# Patient Record
Sex: FEMALE | Race: BLACK OR AFRICAN AMERICAN | NOT HISPANIC OR LATINO | Employment: OTHER | URBAN - METROPOLITAN AREA
[De-identification: names, ages, dates, MRNs, and addresses within clinical notes are randomized per-mention and may not be internally consistent; named-entity substitution may affect disease eponyms.]

---

## 2024-08-03 ENCOUNTER — HOSPITAL ENCOUNTER (EMERGENCY)
Facility: HOSPITAL | Age: 44
Discharge: HOME | End: 2024-08-03
Attending: EMERGENCY MEDICINE
Payer: COMMERCIAL

## 2024-08-03 ENCOUNTER — APPOINTMENT (OUTPATIENT)
Dept: RADIOLOGY | Facility: HOSPITAL | Age: 44
End: 2024-08-03
Payer: COMMERCIAL

## 2024-08-03 VITALS
RESPIRATION RATE: 16 BRPM | TEMPERATURE: 97.3 F | SYSTOLIC BLOOD PRESSURE: 96 MMHG | DIASTOLIC BLOOD PRESSURE: 60 MMHG | OXYGEN SATURATION: 97 % | BODY MASS INDEX: 37.69 KG/M2 | HEART RATE: 67 BPM | HEIGHT: 60 IN | WEIGHT: 192 LBS

## 2024-08-03 DIAGNOSIS — N93.8 DYSFUNCTIONAL UTERINE BLEEDING: ICD-10-CM

## 2024-08-03 DIAGNOSIS — D21.9 FIBROID: ICD-10-CM

## 2024-08-03 DIAGNOSIS — N83.201 CYST OF RIGHT OVARY: ICD-10-CM

## 2024-08-03 DIAGNOSIS — R10.30 LOWER ABDOMINAL PAIN: Primary | ICD-10-CM

## 2024-08-03 LAB
ABO GROUP (TYPE) IN BLOOD: NORMAL
ALBUMIN SERPL BCP-MCNC: 3.9 G/DL (ref 3.4–5)
ALP SERPL-CCNC: 76 U/L (ref 33–110)
ALT SERPL W P-5'-P-CCNC: 18 U/L (ref 7–45)
ANION GAP SERPL CALC-SCNC: 11 MMOL/L (ref 10–20)
ANTIBODY SCREEN: NORMAL
AST SERPL W P-5'-P-CCNC: 11 U/L (ref 9–39)
BASOPHILS # BLD AUTO: 0.08 X10*3/UL (ref 0–0.1)
BASOPHILS NFR BLD AUTO: 1.3 %
BILIRUB SERPL-MCNC: 0.6 MG/DL (ref 0–1.2)
BUN SERPL-MCNC: 9 MG/DL (ref 6–23)
CALCIUM SERPL-MCNC: 8.5 MG/DL (ref 8.6–10.3)
CHLORIDE SERPL-SCNC: 105 MMOL/L (ref 98–107)
CLUE CELLS SPEC QL WET PREP: NORMAL
CO2 SERPL-SCNC: 27 MMOL/L (ref 21–32)
CREAT SERPL-MCNC: 0.74 MG/DL (ref 0.5–1.05)
EGFRCR SERPLBLD CKD-EPI 2021: >90 ML/MIN/1.73M*2
EOSINOPHIL # BLD AUTO: 0.31 X10*3/UL (ref 0–0.7)
EOSINOPHIL NFR BLD AUTO: 5.1 %
ERYTHROCYTE [DISTWIDTH] IN BLOOD BY AUTOMATED COUNT: 12.8 % (ref 11.5–14.5)
GLUCOSE SERPL-MCNC: 73 MG/DL (ref 74–99)
HCG UR QL IA.RAPID: NEGATIVE
HCT VFR BLD AUTO: 37.5 % (ref 36–46)
HGB BLD-MCNC: 11.8 G/DL (ref 12–16)
IMM GRANULOCYTES # BLD AUTO: 0.02 X10*3/UL (ref 0–0.7)
IMM GRANULOCYTES NFR BLD AUTO: 0.3 % (ref 0–0.9)
LYMPHOCYTES # BLD AUTO: 1.63 X10*3/UL (ref 1.2–4.8)
LYMPHOCYTES NFR BLD AUTO: 26.9 %
MCH RBC QN AUTO: 28 PG (ref 26–34)
MCHC RBC AUTO-ENTMCNC: 31.5 G/DL (ref 32–36)
MCV RBC AUTO: 89 FL (ref 80–100)
MONOCYTES # BLD AUTO: 0.38 X10*3/UL (ref 0.1–1)
MONOCYTES NFR BLD AUTO: 6.3 %
NEUTROPHILS # BLD AUTO: 3.63 X10*3/UL (ref 1.2–7.7)
NEUTROPHILS NFR BLD AUTO: 60.1 %
NRBC BLD-RTO: 0 /100 WBCS (ref 0–0)
PLATELET # BLD AUTO: 231 X10*3/UL (ref 150–450)
POTASSIUM SERPL-SCNC: 3.6 MMOL/L (ref 3.5–5.3)
PROT SERPL-MCNC: 7 G/DL (ref 6.4–8.2)
RBC # BLD AUTO: 4.22 X10*6/UL (ref 4–5.2)
RH FACTOR (ANTIGEN D): NORMAL
SODIUM SERPL-SCNC: 139 MMOL/L (ref 136–145)
T VAGINALIS SPEC QL WET PREP: NORMAL
TRICHOMONAS REFLEX COMMENT: NORMAL
WBC # BLD AUTO: 6.1 X10*3/UL (ref 4.4–11.3)
WBC VAG QL WET PREP: NORMAL
YEAST VAG QL WET PREP: NORMAL

## 2024-08-03 PROCEDURE — 80053 COMPREHEN METABOLIC PANEL: CPT | Performed by: EMERGENCY MEDICINE

## 2024-08-03 PROCEDURE — 87661 TRICHOMONAS VAGINALIS AMPLIF: CPT | Mod: AHULAB | Performed by: EMERGENCY MEDICINE

## 2024-08-03 PROCEDURE — 99284 EMERGENCY DEPT VISIT MOD MDM: CPT | Mod: 25

## 2024-08-03 PROCEDURE — 76856 US EXAM PELVIC COMPLETE: CPT | Mod: FOREIGN READ | Performed by: RADIOLOGY

## 2024-08-03 PROCEDURE — 85025 COMPLETE CBC W/AUTO DIFF WBC: CPT | Performed by: EMERGENCY MEDICINE

## 2024-08-03 PROCEDURE — 87491 CHLMYD TRACH DNA AMP PROBE: CPT | Mod: AHULAB | Performed by: EMERGENCY MEDICINE

## 2024-08-03 PROCEDURE — 76830 TRANSVAGINAL US NON-OB: CPT

## 2024-08-03 PROCEDURE — 87210 SMEAR WET MOUNT SALINE/INK: CPT | Performed by: EMERGENCY MEDICINE

## 2024-08-03 PROCEDURE — 2500000001 HC RX 250 WO HCPCS SELF ADMINISTERED DRUGS (ALT 637 FOR MEDICARE OP): Performed by: EMERGENCY MEDICINE

## 2024-08-03 PROCEDURE — 81025 URINE PREGNANCY TEST: CPT | Performed by: EMERGENCY MEDICINE

## 2024-08-03 PROCEDURE — 76830 TRANSVAGINAL US NON-OB: CPT | Mod: FOREIGN READ | Performed by: RADIOLOGY

## 2024-08-03 PROCEDURE — 96374 THER/PROPH/DIAG INJ IV PUSH: CPT

## 2024-08-03 PROCEDURE — 86901 BLOOD TYPING SEROLOGIC RH(D): CPT | Performed by: EMERGENCY MEDICINE

## 2024-08-03 PROCEDURE — 2500000004 HC RX 250 GENERAL PHARMACY W/ HCPCS (ALT 636 FOR OP/ED): Performed by: EMERGENCY MEDICINE

## 2024-08-03 PROCEDURE — 96375 TX/PRO/DX INJ NEW DRUG ADDON: CPT

## 2024-08-03 PROCEDURE — 36415 COLL VENOUS BLD VENIPUNCTURE: CPT | Performed by: EMERGENCY MEDICINE

## 2024-08-03 RX ORDER — MORPHINE SULFATE 4 MG/ML
4 INJECTION, SOLUTION INTRAMUSCULAR; INTRAVENOUS ONCE
Status: COMPLETED | OUTPATIENT
Start: 2024-08-03 | End: 2024-08-03

## 2024-08-03 RX ORDER — OXYCODONE HYDROCHLORIDE 5 MG/1
5 TABLET ORAL ONCE
Status: COMPLETED | OUTPATIENT
Start: 2024-08-03 | End: 2024-08-03

## 2024-08-03 RX ORDER — ACETAMINOPHEN 325 MG/1
975 TABLET ORAL ONCE
Status: COMPLETED | OUTPATIENT
Start: 2024-08-03 | End: 2024-08-03

## 2024-08-03 RX ORDER — NAPROXEN 500 MG/1
500 TABLET ORAL
Qty: 20 TABLET | Refills: 0 | Status: SHIPPED | OUTPATIENT
Start: 2024-08-03 | End: 2024-08-18

## 2024-08-03 RX ORDER — ONDANSETRON HYDROCHLORIDE 2 MG/ML
4 INJECTION, SOLUTION INTRAVENOUS ONCE
Status: COMPLETED | OUTPATIENT
Start: 2024-08-03 | End: 2024-08-03

## 2024-08-03 RX ORDER — ACETAMINOPHEN 325 MG/1
650 TABLET ORAL EVERY 6 HOURS PRN
Qty: 30 TABLET | Refills: 0 | Status: SHIPPED | OUTPATIENT
Start: 2024-08-03

## 2024-08-03 RX ORDER — KETOROLAC TROMETHAMINE 30 MG/ML
15 INJECTION, SOLUTION INTRAMUSCULAR; INTRAVENOUS ONCE
Status: COMPLETED | OUTPATIENT
Start: 2024-08-03 | End: 2024-08-03

## 2024-08-03 RX ADMIN — KETOROLAC TROMETHAMINE 15 MG: 30 INJECTION, SOLUTION INTRAMUSCULAR at 16:16

## 2024-08-03 RX ADMIN — ONDANSETRON 4 MG: 2 INJECTION INTRAMUSCULAR; INTRAVENOUS at 14:53

## 2024-08-03 RX ADMIN — ACETAMINOPHEN 975 MG: 325 TABLET ORAL at 17:26

## 2024-08-03 RX ADMIN — OXYCODONE HYDROCHLORIDE 5 MG: 5 TABLET ORAL at 17:26

## 2024-08-03 RX ADMIN — MORPHINE SULFATE 4 MG: 4 INJECTION, SOLUTION INTRAMUSCULAR; INTRAVENOUS at 14:53

## 2024-08-03 ASSESSMENT — PAIN - FUNCTIONAL ASSESSMENT: PAIN_FUNCTIONAL_ASSESSMENT: 0-10

## 2024-08-03 ASSESSMENT — PAIN SCALES - GENERAL
PAINLEVEL_OUTOF10: 9
PAINLEVEL_OUTOF10: 6
PAINLEVEL_OUTOF10: 6
PAINLEVEL_OUTOF10: 9
PAINLEVEL_OUTOF10: 9

## 2024-08-03 ASSESSMENT — COLUMBIA-SUICIDE SEVERITY RATING SCALE - C-SSRS
2. HAVE YOU ACTUALLY HAD ANY THOUGHTS OF KILLING YOURSELF?: NO
1. IN THE PAST MONTH, HAVE YOU WISHED YOU WERE DEAD OR WISHED YOU COULD GO TO SLEEP AND NOT WAKE UP?: NO
6. HAVE YOU EVER DONE ANYTHING, STARTED TO DO ANYTHING, OR PREPARED TO DO ANYTHING TO END YOUR LIFE?: NO

## 2024-08-03 ASSESSMENT — PAIN DESCRIPTION - LOCATION: LOCATION: PELVIS

## 2024-08-03 NOTE — ED TRIAGE NOTES
Pt arrives to triage with c/o excessive vaginal bleeding during her period and significant pain in her lower abdomen and lower back. Pt states she began what she believed to be her regular menstrual cycle yesterday but has been bleeding through about 2 pads per hour and passing clots. Endorses lightheadedness and dizziness, denies CP/SOB/n/v. States she just stopped birth control last month

## 2024-08-03 NOTE — ED PROVIDER NOTES
CC: Abdominal Pain and Vaginal Bleeding     HPI: Bettye Robert is a 44-year-old female presenting to the emergency department for lower abdominal pain and heavy vaginal bleeding. States her symptoms started yesterday and bleeding has been heavier than normal menses with passage of some clots. She felt lightheaded today so she came to the ED. She denies fever, chills, shortness of breath, syncope, urinary symptoms, vaginal discharge, focal deficits. Is not on anticoagulation. LMP a few weeks ago. Notes she was previously on OCPs but these were discontinued last month.     Limitations to History: none  Additional History Obtained from: none    PMHx/PSHx:  Per HPI.   Past Medical History:   Diagnosis Date    Encounter for initial prescription of contraceptive pills 06/28/2019    Encounter for initial prescription of contraceptive pills    Encounter for pregnancy test, result positive ([facility]) 06/15/2020    Positive urine pregnancy test    Encounter for routine checking of intrauterine contraceptive device 05/30/2019    IUD check up    Irregular menstruation, unspecified 06/15/2020    Missed period    Morbid (severe) obesity due to excess calories (Multi) 05/22/2019    Obesity, Class III, BMI 40-49.9 (morbid obesity)    Other problems related to lifestyle 10/11/2018    Other problems related to lifestyle    Other specified counseling 03/08/2019    Encounter for breast feeding counseling    Other specified noninflammatory disorders of vagina 01/10/2020    Vaginal irritation    Personal history of other diseases of the female genital tract     Personal history of ovarian cyst    Personal history of other diseases of the female genital tract 11/27/2016    History of vaginal discharge    Personal history of other diseases of the respiratory system 05/16/2019    History of asthma     Past Surgical History:   Procedure Laterality Date    CHOLECYSTECTOMY  04/01/2014    Cholecystectomy    OTHER SURGICAL HISTORY  09/18/2014     Surgically Induced        Medications:   Current Outpatient Medications   Medication Instructions    acetaminophen (TYLENOL) 650 mg, oral, Every 6 hours PRN    naproxen (NAPROSYN) 500 mg, oral, 2 times daily (morning and late afternoon)        Allergies:  Allergies   Allergen Reactions    Flagyl [Metronidazole] Nausea/vomiting         ???????????????????????????????????????????????????????????????  Triage Vitals:  T 36.3 °C (97.3 °F)  HR 56  /65  RR 18  O2 100 % None (Room air)    Physical Exam   Gen: awake, well-appearing, no distress  Eyes: no conjunctival injection or scleral icterus, pupils equal, extraocular movements grossly intact  ENT: nares patent, moist mucous membranes  Neck: supple, trachea midline, no masses  Heart: regular rate and rhythm, audible heart sounds  Lungs: clear to auscultation bilaterally, no increased work of breathing  Abdomen: soft, nondistended, mild lower abdominal tenderness, no guarding or rebound  : performed with RN chaperone -- normal external genitalia, speculum exam reveals scant blood in vaginal vault, no appreciable lesions/lacerations, no cervical tenderness, +left adnexal tenderness  Extremities: well-perfused, no edema  Neuro: alert, conversant, no facial asymmetry, speech fluent, moving all extremities, steady gait     ???????????????????????????????????????????????????????????????  ED Course/Medical Decision Makin-year-old female presenting to the emergency department for lower abdominal pain and heavy vaginal bleeding. She's well-appearing, no distress. Is well-perfused. She has mild lower abdominal tenderness but is not peritonitic. Pelvic exam with left adnexal tenderness. She was given symptomatic management. She's not pregnant. Wet prep is negative. Labs are relatively unremarkable including hemoglobin. Ultrasound shows uterine fibroids and a right hemorrhagic cyst. Results were discussed. I suspect her bleeding and pain may be a  combination of ultrasound findings along with  hormonal disruption after stopping the birth control last month. Lower suspicion for acute abdominal process to warrant additional imaging at this time. She is comfortable going home. Prescriptions for analgesics sent to pharmacy. She was advised follow up with gynecologist. Given return precautions.     Diagnoses as of 08/03/24 2140   Lower abdominal pain   Cyst of right ovary   Fibroid   Dysfunctional uterine bleeding     Diagnostic imaging independently reviewed/interpreted by me, as reflected in MDM    External records reviewed: recent inpatient, clinic, and prior ED notes  Social Determinants Affecting Care: None identified  Discussion of management with other providers: gyn  Escalation of Care: outpatient management    Disposition: discharge  ED Prescriptions       Medication Sig Dispense Start Date End Date Auth. Provider    acetaminophen (TylenoL) 325 mg tablet Take 2 tablets (650 mg) by mouth every 6 hours if needed for mild pain (1 - 3) or fever (temp greater than 38.0 C). 30 tablet 8/3/2024 -- Berna Kaminski MD    naproxen (Naprosyn) 500 mg tablet Take 1 tablet (500 mg) by mouth 2 times daily (morning and late afternoon) for 15 days. 20 tablet 8/3/2024 8/18/2024 Berna Kaminski MD            Procedures ? [employer] last updated 8/3/2024 9:40 PM        Berna Kaminski MD  08/03/24 2145

## 2024-08-03 NOTE — DISCHARGE INSTRUCTIONS
You were seen in the ED for lower abdominal pain and vaginal bleeding. Your ultrasound showed uterine fibroids and a right ovarian cyst. Monitor symptoms closely. Follow up with your gynecologist. Take tylenol and naproxen regularly for the next few days. Return to the ED with worsening symptoms.

## 2024-08-04 LAB
C TRACH RRNA SPEC QL NAA+PROBE: NEGATIVE
N GONORRHOEA DNA SPEC QL PROBE+SIG AMP: NEGATIVE
T VAGINALIS RRNA SPEC QL NAA+PROBE: NEGATIVE

## 2024-12-06 ENCOUNTER — HOSPITAL ENCOUNTER (EMERGENCY)
Facility: HOSPITAL | Age: 44
Discharge: HOME | End: 2024-12-06
Payer: COMMERCIAL

## 2024-12-06 ENCOUNTER — APPOINTMENT (OUTPATIENT)
Dept: RADIOLOGY | Facility: HOSPITAL | Age: 44
End: 2024-12-06
Payer: COMMERCIAL

## 2024-12-06 VITALS
HEIGHT: 60 IN | TEMPERATURE: 97.5 F | SYSTOLIC BLOOD PRESSURE: 99 MMHG | DIASTOLIC BLOOD PRESSURE: 50 MMHG | WEIGHT: 193 LBS | HEART RATE: 73 BPM | RESPIRATION RATE: 18 BRPM | OXYGEN SATURATION: 100 % | BODY MASS INDEX: 37.89 KG/M2

## 2024-12-06 DIAGNOSIS — S93.402A SPRAIN OF LEFT ANKLE, INITIAL ENCOUNTER: Primary | ICD-10-CM

## 2024-12-06 PROCEDURE — 73630 X-RAY EXAM OF FOOT: CPT | Mod: LT

## 2024-12-06 PROCEDURE — 73630 X-RAY EXAM OF FOOT: CPT | Mod: LEFT SIDE | Performed by: RADIOLOGY

## 2024-12-06 PROCEDURE — 73610 X-RAY EXAM OF ANKLE: CPT | Mod: LT

## 2024-12-06 PROCEDURE — 2500000001 HC RX 250 WO HCPCS SELF ADMINISTERED DRUGS (ALT 637 FOR MEDICARE OP)

## 2024-12-06 PROCEDURE — 99284 EMERGENCY DEPT VISIT MOD MDM: CPT

## 2024-12-06 PROCEDURE — 73610 X-RAY EXAM OF ANKLE: CPT | Mod: LEFT SIDE | Performed by: RADIOLOGY

## 2024-12-06 RX ORDER — ACETAMINOPHEN 325 MG/1
975 TABLET ORAL ONCE
Status: COMPLETED | OUTPATIENT
Start: 2024-12-06 | End: 2024-12-06

## 2024-12-06 RX ORDER — IBUPROFEN 600 MG/1
600 TABLET ORAL ONCE
Status: COMPLETED | OUTPATIENT
Start: 2024-12-06 | End: 2024-12-06

## 2024-12-06 ASSESSMENT — PAIN DESCRIPTION - DESCRIPTORS: DESCRIPTORS: THROBBING;SHARP

## 2024-12-06 ASSESSMENT — PAIN DESCRIPTION - FREQUENCY: FREQUENCY: CONSTANT/CONTINUOUS

## 2024-12-06 ASSESSMENT — COLUMBIA-SUICIDE SEVERITY RATING SCALE - C-SSRS
6. HAVE YOU EVER DONE ANYTHING, STARTED TO DO ANYTHING, OR PREPARED TO DO ANYTHING TO END YOUR LIFE?: NO
1. IN THE PAST MONTH, HAVE YOU WISHED YOU WERE DEAD OR WISHED YOU COULD GO TO SLEEP AND NOT WAKE UP?: NO
2. HAVE YOU ACTUALLY HAD ANY THOUGHTS OF KILLING YOURSELF?: NO

## 2024-12-06 ASSESSMENT — PAIN DESCRIPTION - LOCATION: LOCATION: ANKLE

## 2024-12-06 ASSESSMENT — PAIN DESCRIPTION - PAIN TYPE: TYPE: ACUTE PAIN

## 2024-12-06 ASSESSMENT — PAIN DESCRIPTION - ONSET: ONSET: SUDDEN

## 2024-12-06 ASSESSMENT — PAIN DESCRIPTION - ORIENTATION: ORIENTATION: LEFT

## 2024-12-06 ASSESSMENT — PAIN SCALES - GENERAL: PAINLEVEL_OUTOF10: 10 - WORST POSSIBLE PAIN

## 2024-12-06 ASSESSMENT — PAIN DESCRIPTION - PROGRESSION: CLINICAL_PROGRESSION: NOT CHANGED

## 2024-12-06 ASSESSMENT — PAIN - FUNCTIONAL ASSESSMENT: PAIN_FUNCTIONAL_ASSESSMENT: 0-10

## 2024-12-07 NOTE — ED TRIAGE NOTES
Patient presents to ED from home for left ankle pain from a slip and fall earlier today. Patient attempted to rest and ice the leg but it has progressively gotten worse.

## 2024-12-07 NOTE — ED PROVIDER NOTES
Chief Complaint   Patient presents with    Ankle Pain       44-year-old female arrives emergency department chief complaint of a slip on ice resulting in left ankle pain.  Patient states that she was walking down the sidewalk, her left ankle slipped on ice rotating outward, causing the patient to fall down onto her backside.  The patient denies any pain in hip or buttocks.  Patient has left ankle soft tissue swelling on the lateral aspect hurting worse with full extension and weightbearing.  Patient endorses a 10 out of 10 pain with any weightbearing, 6 out of 10 pain at rest or with mild palpation.  Patient is neurovascularly intact in left lower extremity.  Patient did not strike her head, denies any other symptoms or complaints      History provided by:  Patient   used: No         PmHx, PsHx, Allergies, Family Hx, social Hx reviewed as documented    Given the focused nature of the complaint, only related components review of systems were evaluated, and abnormalities indicated in HPI    Physical Exam:    General: Patient is AAOx3, appears well developed, well nourished, is a good historian, answers questions appropriately    HEENT: head normocephalic, atraumatic, PERRLA, EOMs intact, oropharynx without erythema or exudate, buccal mucosa intact without lesions, TMs unremarkable, nose is patent bilateral    Pulmonary: CTAB, no accessory muscle use, able to speak full clear sentences    Cardiac: HRRR, no murmurs, rubs or gallops    GI: soft, non-tender, non-distended    Musculoskeletal: Left ankle pain per HPI full weight bearing with assistance on left, otherwise DAVIES, no joint effusions, clubbing or edema noted    Skin: Soft tissue swelling on lateral aspect of left ankle per HPI, otherwise skin intact, no lesions or rashes noted, turgor is good.    Medical Decision Making  This patient was seen in the emergency department with an attending physician available at all times throughout their ED  course    Primary consideration for this patient given her presentation would be sprain versus fracture of the left ankle, there is no suspected neurovascular compromise.  An x-ray will be used to further evaluate.  Patient given 975mg of acetaminophen as well as 600 mg of ibuprofen    Patient's x-rays of both the foot and the ankle are negative for any acute abnormality consistent with a sprain of the left ankle.  Primary nursing staff applied Ace wrap to minimize further swelling as well as an Aircast.  The patient is not able to ambulate without extreme pain, patient fitted with crutches by the primary nurse, patient demonstrated teach back for crutch walking    Patient is amenable to the plan of discharge as outlined above, all patient's questions pertaining to their ED course were answered in their entirety.  Strict return precautions were discussed with the patient and they verbalized understanding.  Further, it was made clear to the patient that from an emergent basis, all effort and testing was done to eliminate any imminent dangerous or potentially dangerous conditions of the patient however if their symptoms get much worse or feel life-threatening, they are to return to the emergency department or call 911 immediately.    Amount and/or Complexity of Data Reviewed  Radiology: ordered. Decision-making details documented in ED Course.       Diagnoses as of 12/06/24 2111   Sprain of left ankle, initial encounter       The patient has had the following imaging during this ER visit: XR ANKLE LEFT 3+ VIEWS  XR FOOT LEFT 3+ VIEWS  NURSING COMMUNICATION - DO NOT USE IN ORDER SETS     Patient History   Past Medical History:   Diagnosis Date    Encounter for initial prescription of contraceptive pills 06/28/2019    Encounter for initial prescription of contraceptive pills    Encounter for pregnancy test, result positive ([facility]) 06/15/2020    Positive urine pregnancy test    Encounter for routine checking of  intrauterine contraceptive device 2019    IUD check up    Irregular menstruation, unspecified 06/15/2020    Missed period    Morbid (severe) obesity due to excess calories ([facility]) 2019    Obesity, Class III, BMI 40-49.9 (morbid obesity)    Other problems related to lifestyle 10/11/2018    Other problems related to lifestyle    Other specified counseling 2019    Encounter for breast feeding counseling    Other specified noninflammatory disorders of vagina 01/10/2020    Vaginal irritation    Personal history of other diseases of the female genital tract     Personal history of ovarian cyst    Personal history of other diseases of the female genital tract 2016    History of vaginal discharge    Personal history of other diseases of the respiratory system 2019    History of asthma     Past Surgical History:   Procedure Laterality Date    CHOLECYSTECTOMY  2014    Cholecystectomy    OTHER SURGICAL HISTORY  2014    Surgically Induced      No family history on file.  Social History     Tobacco Use    Smoking status: Not on file    Smokeless tobacco: Not on file   Substance Use Topics    Alcohol use: Not on file    Drug use: Not on file       ED Triage Vitals [24 1906]   Temperature Heart Rate Respirations BP   36.4 °C (97.5 °F) 73 18 99/50      Pulse Ox Temp Source Heart Rate Source Patient Position   100 % Temporal Monitor Sitting      BP Location FiO2 (%)     Left arm --       Vitals:    24 1906   BP: 99/50   BP Location: Left arm   Patient Position: Sitting   Pulse: 73   Resp: 18   Temp: 36.4 °C (97.5 °F)   TempSrc: Temporal   SpO2: 100%   Weight: 87.5 kg (193 lb)   Height: 1.524 m (5')               KWAN Lindsey-CNP  247

## 2024-12-07 NOTE — DISCHARGE INSTRUCTIONS
As discussed, please remember the acronym RICE: Rest, ice, compression, elevation.  Please take 600 mg of ibuprofen every 6 hours for the next 2 to 5 days and then as needed after that.  Please ice the affected extremity